# Patient Record
(demographics unavailable — no encounter records)

---

## 2024-10-08 NOTE — ASSESSMENT
[FreeTextEntry1] : Persistent nausea and vomiting-resolved Hospitalized in August for symptoms Since discharge has been feeling better Poorly controlled diabetic Possible component of gastroparesis vs acute enteritis Plan: Will defer gastric emptying study at this time given resolution of symptoms Added HbA1c to lab work ordered Alert clinic if return of symptoms  Colon cancer screening Average risk Prior colonoscopy: none Alarm symptoms: none Plan: Importance of colon cancer screening was discussed Recommended patient undergo screening at age 45  Follow up on as needed basis

## 2024-10-08 NOTE — HISTORY OF PRESENT ILLNESS
[de-identified] : 2024; erythema in the gastric body, biopsy showing chronic gastritis, negative for h pylori [FreeTextEntry1] : none Dapsone Counseling: I discussed with the patient the risks of dapsone including but not limited to hemolytic anemia, agranulocytosis, rashes, methemoglobinemia, kidney failure, peripheral neuropathy, headaches, GI upset, and liver toxicity.  Patients who start dapsone require monitoring including baseline LFTs and weekly CBCs for the first month, then every month thereafter.  The patient verbalized understanding of the proper use and possible adverse effects of dapsone.  All of the patient's questions and concerns were addressed.

## 2024-10-08 NOTE — PHYSICAL EXAM
[Alert] : alert [Normal Voice/Communication] : normal voice/communication [Healthy Appearing] : healthy appearing [No Acute Distress] : no acute distress [None] : no edema [Normal] : normal bowel sounds, non-tender, no masses, soft, no no hepato-splenomegaly [Bowel Sounds] : normal bowel sounds [Abdomen Tenderness] : non-tender [No Masses] : no abdominal mass palpated [Abdomen Soft] : soft [Abnormal Walk] : normal gait [Normal Color / Pigmentation] : normal skin color and pigmentation [Motor Exam] : the motor exam was normal [Oriented To Time, Place, And Person] : oriented to person, place, and time [Normal Affect] : the affect was normal [Normal Mood] : the mood was normal

## 2024-11-02 NOTE — HISTORY OF PRESENT ILLNESS
[FreeTextEntry1] : CPE  [de-identified] : 31 yo female PMH DM Type 1, chronic vaginal yeast infection presenting today for CPE.   he follows with GYN (Dr. Ector Resenedz) , Endocrinologist (Dr. Misbah Moran, YASHIRA Estrada).

## 2024-11-02 NOTE — HISTORY OF PRESENT ILLNESS
[FreeTextEntry1] : CPE  [de-identified] : 29 yo female PMH DM Type 1, chronic vaginal yeast infection presenting today for CPE.   he follows with GYN (Dr. Ector Resendez) , Endocrinologist (Dr. Misbah Moran, YASHIRA Estrada).

## 2024-11-02 NOTE — PLAN
[FreeTextEntry1] : #HCM  -lab work script provided  -vaccinations:      COVID : declined      Influenza : declined ---      TDAP -depression screen negative  -follow w/ GYN up to date w/ pap smear ----- -advised to f/u with Derm for skin cancer screening

## 2024-11-04 NOTE — ASSESSMENT
[FreeTextEntry1] : 29 yo female PMH DM Type 1, chronic vaginal yeast infection presenting today for CPE.